# Patient Record
Sex: FEMALE | Race: WHITE | Employment: FULL TIME | URBAN - METROPOLITAN AREA
[De-identification: names, ages, dates, MRNs, and addresses within clinical notes are randomized per-mention and may not be internally consistent; named-entity substitution may affect disease eponyms.]

---

## 2017-05-19 ENCOUNTER — OP HISTORICAL/CONVERTED ENCOUNTER (OUTPATIENT)
Dept: OTHER | Age: 27
End: 2017-05-19

## 2017-08-05 ENCOUNTER — HOSPITAL ENCOUNTER (EMERGENCY)
Age: 27
Discharge: HOME | End: 2017-08-05
Attending: EMERGENCY MEDICINE
Payer: COMMERCIAL

## 2017-08-05 VITALS
DIASTOLIC BLOOD PRESSURE: 70 MMHG | WEIGHT: 145 LBS | TEMPERATURE: 97.5 F | BODY MASS INDEX: 24.16 KG/M2 | HEART RATE: 45 BPM | OXYGEN SATURATION: 100 % | HEIGHT: 65 IN | SYSTOLIC BLOOD PRESSURE: 124 MMHG | RESPIRATION RATE: 24 BRPM

## 2017-08-05 DIAGNOSIS — R11.2 NAUSEA AND VOMITING, INTRACTABILITY OF VOMITING NOT SPECIFIED, UNSPECIFIED VOMITING TYPE: Primary | ICD-10-CM

## 2017-08-05 LAB
ALBUMIN SERPL BCP-MCNC: 4.1 G/DL (ref 3.5–5)
ALBUMIN/GLOB SERPL: 1 {RATIO} (ref 1.1–2.2)
ALP SERPL-CCNC: 73 U/L (ref 45–117)
ALT SERPL-CCNC: 36 U/L (ref 12–78)
ANION GAP BLD CALC-SCNC: 9 MMOL/L (ref 5–15)
AST SERPL W P-5'-P-CCNC: 23 U/L (ref 15–37)
BASOPHILS # BLD AUTO: 0.1 K/UL (ref 0–0.1)
BASOPHILS # BLD: 1 % (ref 0–1)
BILIRUB SERPL-MCNC: 0.5 MG/DL (ref 0.2–1)
BUN SERPL-MCNC: 7 MG/DL (ref 6–20)
BUN/CREAT SERPL: 7 (ref 12–20)
CALCIUM SERPL-MCNC: 9 MG/DL (ref 8.5–10.1)
CHLORIDE SERPL-SCNC: 107 MMOL/L (ref 97–108)
CO2 SERPL-SCNC: 23 MMOL/L (ref 21–32)
CREAT SERPL-MCNC: 1.04 MG/DL (ref 0.55–1.02)
EOSINOPHIL # BLD: 0.4 K/UL (ref 0–0.4)
EOSINOPHIL NFR BLD: 4 % (ref 0–7)
ERYTHROCYTE [DISTWIDTH] IN BLOOD BY AUTOMATED COUNT: 14.1 % (ref 11.5–14.5)
GLOBULIN SER CALC-MCNC: 4.1 G/DL (ref 2–4)
GLUCOSE SERPL-MCNC: 113 MG/DL (ref 65–100)
HCT VFR BLD AUTO: 39.4 % (ref 35–47)
HGB BLD-MCNC: 13 G/DL (ref 11.5–16)
LYMPHOCYTES # BLD AUTO: 30 % (ref 12–49)
LYMPHOCYTES # BLD: 3.2 K/UL (ref 0.8–3.5)
MCH RBC QN AUTO: 30.4 PG (ref 26–34)
MCHC RBC AUTO-ENTMCNC: 33 G/DL (ref 30–36.5)
MCV RBC AUTO: 92.1 FL (ref 80–99)
MONOCYTES # BLD: 0.6 K/UL (ref 0–1)
MONOCYTES NFR BLD AUTO: 6 % (ref 5–13)
NEUTS SEG # BLD: 6.5 K/UL (ref 1.8–8)
NEUTS SEG NFR BLD AUTO: 59 % (ref 32–75)
PLATELET # BLD AUTO: 318 K/UL (ref 150–400)
POTASSIUM SERPL-SCNC: 3.7 MMOL/L (ref 3.5–5.1)
PROT SERPL-MCNC: 8.2 G/DL (ref 6.4–8.2)
RBC # BLD AUTO: 4.28 M/UL (ref 3.8–5.2)
SODIUM SERPL-SCNC: 139 MMOL/L (ref 136–145)
WBC # BLD AUTO: 10.9 K/UL (ref 3.6–11)

## 2017-08-05 PROCEDURE — 80053 COMPREHEN METABOLIC PANEL: CPT | Performed by: EMERGENCY MEDICINE

## 2017-08-05 PROCEDURE — 99284 EMERGENCY DEPT VISIT MOD MDM: CPT

## 2017-08-05 PROCEDURE — 74011250636 HC RX REV CODE- 250/636: Performed by: EMERGENCY MEDICINE

## 2017-08-05 PROCEDURE — 36415 COLL VENOUS BLD VENIPUNCTURE: CPT | Performed by: EMERGENCY MEDICINE

## 2017-08-05 PROCEDURE — 85025 COMPLETE CBC W/AUTO DIFF WBC: CPT | Performed by: EMERGENCY MEDICINE

## 2017-08-05 PROCEDURE — 96360 HYDRATION IV INFUSION INIT: CPT

## 2017-08-05 RX ORDER — PROMETHAZINE HYDROCHLORIDE 25 MG/1
25 TABLET ORAL
Qty: 12 TAB | Refills: 0 | Status: SHIPPED | OUTPATIENT
Start: 2017-08-05 | End: 2022-05-08

## 2017-08-05 RX ORDER — ONDANSETRON 2 MG/ML
4 INJECTION INTRAMUSCULAR; INTRAVENOUS
Status: DISCONTINUED | OUTPATIENT
Start: 2017-08-05 | End: 2017-08-05 | Stop reason: HOSPADM

## 2017-08-05 RX ADMIN — SODIUM CHLORIDE 1000 ML: 900 INJECTION, SOLUTION INTRAVENOUS at 09:22

## 2017-08-05 NOTE — ED PROVIDER NOTES
HPI Comments: 24 y.o. female with past medical history significant for migraines and depression who presents in a wheelchair from home accompanied by her mother with chief complaint of vomiting. Pt states she woke up at 1930 this morning feeling feverish, diaphoretic and nauseous. Pt states she went to the bathroom where she vomiting at least 10 times. Pt states throughout the episodes, her \"whole body was tingling from head to toe\". Pt states she was having difficulty controlling her breathing. Pt states she tried to call her mother, but her hands \"got stuck and (she) couldn't dial the numbers\". When she did get in contact with her mom, her mom noticed her speech sounded garbled, like her tongue was swollen. Pt states she was asymptomatic prior to going to bed. Pt states symptoms are improved when she is laying down still. Pt takes Concerta daily. Pt had a mild headache yesterday. Pt endorses a history of migraines. Pt specifically denies headache, abdominal pain, changes in bowel movements and changes in urination. There are no other acute medical concerns at this time. Social hx: denies tobacco use; denies EtOH use; denies illicit drug use  Significant FMHx: father - stroke in his 45s; sister - blood clots  PCP: Shonda Chappell MD    Note written by Femi Castaneda, as dictated by Vladimir Maynard MD 9:27 AM        The history is provided by the patient and a parent. Past Medical History:   Diagnosis Date    Depression     Migraine        Past Surgical History:   Procedure Laterality Date    HX BREAST AUGMENTATION           Family History:   Problem Relation Age of Onset    Stroke Father        Social History     Social History    Marital status: SINGLE     Spouse name: N/A    Number of children: N/A    Years of education: N/A     Occupational History    Not on file.      Social History Main Topics    Smoking status: Never Smoker    Smokeless tobacco: Never Used    Alcohol use No    Drug use: No    Sexual activity: Yes     Partners: Male     Birth control/ protection: None     Other Topics Concern    Not on file     Social History Narrative         ALLERGIES: Review of patient's allergies indicates no known allergies. Review of Systems   Constitutional: Positive for diaphoresis. Negative for chills and fever. HENT: Negative for rhinorrhea and sore throat. Respiratory: Positive for shortness of breath. Negative for cough. Cardiovascular: Negative for chest pain. Gastrointestinal: Positive for nausea and vomiting. Negative for abdominal pain, constipation and diarrhea. Genitourinary: Negative for dysuria and urgency. Musculoskeletal: Negative for arthralgias and back pain. Skin: Negative for rash. Neurological: Negative for dizziness, weakness and light-headedness. All other systems reviewed and are negative. Vitals:    08/05/17 0915   BP: 119/78   Pulse: (!) 45   Resp: 24   SpO2: 100%   Weight: 65.8 kg (145 lb)   Height: 5' 5\" (1.651 m)            Physical Exam   Nursing note and vitals reviewed. Const:  No acute distress, well developed, well nourished  Head:  Atraumatic, normocephalic  Eyes:  PERRL, conjunctiva normal, no scleral icterus  Neck:  Supple, trachea midline  Cardiovascular:  RRR, no murmurs, no gallops, no rubs  Resp:  No resp distress, no increased work of breathing, no wheezes, no rhonchi, no rales,  Abd:  Soft, non-tender, non-distended, no rebound, no guarding, no CVA tenderness  :  Deferred  MSK:  No pedal edema, normal ROM  Neuro:  Alert and oriented x3, no cranial nerve defect  Skin:  Warm, dry, intact.  Peeling skin on lower legs (pt states this is due to sunburn)  Psych: Anxious-appearing, behavior is normal, judgement and thought content is normal  Note written by Femi Dorman, as dictated by Alanna Andrade MD 9:27 AM     MDM  Number of Diagnoses or Management Options  Nausea and vomiting, intractability of vomiting not specified, unspecified vomiting type:      Amount and/or Complexity of Data Reviewed  Clinical lab tests: ordered and reviewed  Tests in the radiology section of CPT®: ordered and reviewed  Review and summarize past medical records: yes    Patient Progress  Patient progress: stable    ED Course     Pt. Presents to the ER with n/v and sx that sound like they are likely 2/2 hyperventilation. Pt. Is well appearing in the ER. Unclear cause of her problem. Pt. Refused several parts of her work-up and demanded to leave in the middle of her work-up. I will start her on phenergan. Pt. To f/u with her PCP or return to the ER with worsening sx. Procedures    10:59 AM  Pt requesting discharge per nurse. 11:15 AM  Discussed available lab and imaging results with patient and mother. Pt requesting discharge. Pt refusing to complete work-up in the ED. Both verbalize understanding and agree with care plan. Will discharge patient home with nausea medications and PCP f/u.

## 2017-08-05 NOTE — ED NOTES
Discharge instructions reviewed with patient who verbalized understanding and is ambulatory from the department.

## 2017-08-05 NOTE — DISCHARGE INSTRUCTIONS
Nausea and Vomiting: Care Instructions  Your Care Instructions    When you are nauseated, you may feel weak and sweaty and notice a lot of saliva in your mouth. Nausea often leads to vomiting. Most of the time you do not need to worry about nausea and vomiting, but they can be signs of other illnesses. Two common causes of nausea and vomiting are stomach flu and food poisoning. Nausea and vomiting from viral stomach flu will usually start to improve within 24 hours. Nausea and vomiting from food poisoning may last from 12 to 48 hours. The doctor has checked you carefully, but problems can develop later. If you notice any problems or new symptoms, get medical treatment right away. Follow-up care is a key part of your treatment and safety. Be sure to make and go to all appointments, and call your doctor if you are having problems. It's also a good idea to know your test results and keep a list of the medicines you take. How can you care for yourself at home? · To prevent dehydration, drink plenty of fluids, enough so that your urine is light yellow or clear like water. Choose water and other caffeine-free clear liquids until you feel better. If you have kidney, heart, or liver disease and have to limit fluids, talk with your doctor before you increase the amount of fluids you drink. · Rest in bed until you feel better. · When you are able to eat, try clear soups, mild foods, and liquids until all symptoms are gone for 12 to 48 hours. Other good choices include dry toast, crackers, cooked cereal, and gelatin dessert, such as Jell-O. When should you call for help? Call 911 anytime you think you may need emergency care. For example, call if:  · You passed out (lost consciousness). Call your doctor now or seek immediate medical care if:  · You have symptoms of dehydration, such as:  ¨ Dry eyes and a dry mouth. ¨ Passing only a little dark urine.   ¨ Feeling thirstier than usual.  · You have new or worsening belly pain. · You have a new or higher fever. · You vomit blood or what looks like coffee grounds. Watch closely for changes in your health, and be sure to contact your doctor if:  · You have ongoing nausea and vomiting. · Your vomiting is getting worse. · Your vomiting lasts longer than 2 days. · You are not getting better as expected. Where can you learn more? Go to http://shoshana-andriy.info/. Enter 25 [id number] in the search box to learn more about \"Nausea and Vomiting: Care Instructions. \"  Current as of: March 20, 2017  Content Version: 11.3  © 8812-7913 [employer]. Care instructions adapted under license by [employer] (which disclaims liability or warranty for this information). If you have questions about a medical condition or this instruction, always ask your healthcare professional. [address] any warranty or liability for your use of this information. We hope that we have addressed all of your medical concerns. The examination and treatment you received in the Emergency Department were for an emergent problem and were not intended as complete care. It is important that you follow up with your healthcare provider(s) for ongoing care. If your symptoms worsen or do not improve as expected, and you are unable to reach your usual health care provider(s), you should return to the Emergency Department. Today's healthcare is undergoing tremendous change, and patient satisfaction surveys are one of the many tools to assess the quality of medical care. You may receive a survey from the [employer] organization regarding your experience in the Emergency Department. I hope that your experience has been completely positive, particularly the medical care that I provided. As such, please participate in the survey; anything less than excellent does not meet my expectations or intentions.         [address] [facility] participate in nationally recognized quality of care measures. If your blood pressure is greater than 120/80, as reported below, we urge that you seek medical care to address the potential of high blood pressure, commonly known as hypertension. Hypertension can be hereditary or can be caused by certain medical conditions, pain, stress, or \"white coat syndrome. \"       Please make an appointment with your health care provider(s) for follow up of your Emergency Department visit. VITALS:   Patient Vitals for the past 8 hrs:   Temp Pulse Resp BP SpO2   08/05/17 1016 - - - 124/70 100 %   08/05/17 0915 97.5 °F (36.4 °C) (!) 45 24 119/78 100 %          Thank you for allowing us to provide you with medical care today. We realize that you have many choices for your emergency care needs. Please choose us in the future for any continued health care needs. [place] [facility] Gilbert, [address] [place] [address].   Office: [phone]            Recent Results (from the past 24 hour(s))   CBC WITH AUTOMATED DIFF    Collection Time: 08/05/17  9:24 AM   Result Value Ref Range    WBC 10.9 3.6 - 11.0 K/uL    RBC 4.28 3.80 - 5.20 M/uL    HGB 13.0 11.5 - 16.0 g/dL    HCT 39.4 35.0 - 47.0 %    MCV 92.1 80.0 - 99.0 FL    MCH 30.4 26.0 - 34.0 PG    MCHC 33.0 30.0 - 36.5 g/dL    RDW 14.1 11.5 - 14.5 %    PLATELET 543 540 - 461 K/uL    NEUTROPHILS 59 32 - 75 %    LYMPHOCYTES 30 12 - 49 %    MONOCYTES 6 5 - 13 %    EOSINOPHILS 4 0 - 7 %    BASOPHILS 1 0 - 1 %    ABS. NEUTROPHILS 6.5 1.8 - 8.0 K/UL    ABS. LYMPHOCYTES 3.2 0.8 - 3.5 K/UL    ABS. MONOCYTES 0.6 0.0 - 1.0 K/UL    ABS. EOSINOPHILS 0.4 0.0 - 0.4 K/UL    ABS.  BASOPHILS 0.1 0.0 - 0.1 K/UL   METABOLIC PANEL, COMPREHENSIVE    Collection Time: 08/05/17  9:24 AM   Result Value Ref Range    Sodium 139 136 - 145 mmol/L    Potassium 3.7 3.5 - 5.1 mmol/L    Chloride 107 97 - 108 mmol/L    CO2 23 21 - 32 mmol/L    Anion gap 9 5 - 15 mmol/L    Glucose 113 (H) 65 - 100 mg/dL    BUN 7 6 - 20 MG/DL    Creatinine 1.04 (H) 0.55 - 1.02 MG/DL    BUN/Creatinine ratio 7 (L) 12 - 20      GFR est AA >60 >60 ml/min/1.73m2    GFR est non-AA >60 >60 ml/min/1.73m2    Calcium 9.0 8.5 - 10.1 MG/DL    Bilirubin, total 0.5 0.2 - 1.0 MG/DL    ALT (SGPT) 36 12 - 78 U/L    AST (SGOT) 23 15 - 37 U/L    Alk.  phosphatase 73 45 - 117 U/L    Protein, total 8.2 6.4 - 8.2 g/dL    Albumin 4.1 3.5 - 5.0 g/dL    Globulin 4.1 (H) 2.0 - 4.0 g/dL    A-G Ratio 1.0 (L) 1.1 - 2.2

## 2017-08-05 NOTE — ED TRIAGE NOTES
Triage note: Pt states she woke up this morning soaked in sweat and began vomiting. Pt is hyperventilating.

## 2021-10-07 LAB
ANTIBODY SCREEN, EXTERNAL: NEGATIVE
HBSAG, EXTERNAL: NEGATIVE
HIV, EXTERNAL: NEGATIVE
RPR, EXTERNAL: NON REACTIVE
RUBELLA, EXTERNAL: NORMAL
TYPE, ABO & RH, EXTERNAL: NORMAL

## 2021-12-23 ENCOUNTER — HOSPITAL ENCOUNTER (OUTPATIENT)
Dept: PERINATAL CARE | Age: 31
Discharge: HOME | End: 2021-12-23
Attending: OBSTETRICS & GYNECOLOGY
Payer: COMMERCIAL

## 2021-12-23 PROCEDURE — 76811 OB US DETAILED SNGL FETUS: CPT | Performed by: OBSTETRICS & GYNECOLOGY

## 2022-03-16 ENCOUNTER — HOSPITAL ENCOUNTER (OUTPATIENT)
Age: 32
Discharge: HOME | End: 2022-03-16
Attending: OBSTETRICS & GYNECOLOGY | Admitting: OBSTETRICS & GYNECOLOGY
Payer: COMMERCIAL

## 2022-03-16 VITALS — DIASTOLIC BLOOD PRESSURE: 68 MMHG | HEART RATE: 87 BPM | SYSTOLIC BLOOD PRESSURE: 131 MMHG

## 2022-03-16 PROBLEM — O36.8130 DECREASED FETAL MOVEMENTS IN THIRD TRIMESTER: Status: ACTIVE | Noted: 2022-03-16

## 2022-03-16 PROBLEM — Z3A.32 32 WEEKS GESTATION OF PREGNANCY: Status: ACTIVE | Noted: 2022-03-16

## 2022-03-16 PROCEDURE — 99282 EMERGENCY DEPT VISIT SF MDM: CPT

## 2022-03-16 PROCEDURE — 59025 FETAL NON-STRESS TEST: CPT

## 2022-03-16 NOTE — PROGRESS NOTES
1900: Bedside and Verbal shift change report given to PANCHITO Cerda RN (oncoming nurse) by PANCHITO Washburn RN (offgoing nurse). Report included the following information SBAR, Kardex, Intake/Output, MAR and Recent Results. 2004: Dr. Mercado at bedside for ultrasound. Pt to be discharge at this time. Pt verbalizes understanding and agreeable to POC. NST reactive. Pt taken off EFM at this time. 2022: This RN at bedside. Pt educated on discharge instructions including kick counts and pregnancy precautions. follow up appts. 2028: Pt walking off unit at this time.

## 2022-03-17 NOTE — H&P
History & Physical    Name: Marisela Salas MRN: [id number]  SSN: [id number]    YOB: 1990  Age: 36 y.o. Sex: female        Subjective:   Chief Complaint: Decreased fetal movement  Estimated Date of Delivery: 22  OB History    Para Term  AB Living   1             SAB IAB Ectopic Molar Multiple Live Births                    # Outcome Date GA Lbr Jono/2nd Weight Sex Delivery Anes PTL Lv   1 Current                Griffin Fish, 36 y.o.,  ,  presents at [address] [place] [address], complaining of Decreased fetal movement. She reports little fetal movement all day. She was seen in the office and sent here for evaluation. Prenatal course was normal. Please see prenatal records for details. No Known Allergies    Prior to Admission medications    Medication Sig Start Date End Date Taking? Authorizing Provider   DEXTRAN 70/HYPROMELLOSE/PF (NATURAL TEARS, PF, OP) Apply 2 Drops to eye as needed. Provider, Historical   promethazine (PHENERGAN) 25 mg tablet Take 1 Tab by mouth every six (6) hours as needed for Nausea. 17   Roz Gutierrez MD   methylphenidate (CONCERTA) 36 mg CR tablet Take 36 mg by mouth every morning. Provider, Historical       Past Medical History:   Diagnosis Date    Depression     Migraine        Past Surgical History:   Procedure Laterality Date    HX BREAST AUGMENTATION         Social History     Occupational History    Not on file   Tobacco Use    Smoking status: Never Smoker    Smokeless tobacco: Never Used   Substance and Sexual Activity    Alcohol use: No    Drug use: No    Sexual activity: Yes     Partners: Male     Birth control/protection: None       Family History   Problem Relation Age of Onset    Stroke Father        Review of Systems   All other systems reviewed and are negative. Objective:     Physical Exam:    Visit Vitals  /68   Pulse 87       General:   36 y.o.  female who appears to be in no acute distress. HEENT:   Normocephalic. Pupils are equal, round, and reactive to light. Extraocular movements are intact. Pharynx is clear. Neck:   Normal range of motion. No thyromegaly. Heart:   Regular rate and rhythm. No murmurs present. Lungs:   Clear, no rales, rhonchi, or wheezes. Abdomen:   Soft, gravid, not tender, normal bowel sounds. No CVA tenderness   Leopold's: Vertex   Uterine Activity:    Frequency: None  Fetal Heart Rate:     Baseline: 135 bpm    Variability: Moderate    Accelerations: Present (15 x 15 bpm)    Decelerations: None  Category: 1  Pelvic:    Membranes: Intact  Extremites:   Trace bilateral pedal edema. Full range of motion. Neuro:    Alert. Oriented. CN 2 - 12 intact. Motor and sensory exam grossly normal.      Prenatal Labs   Lab Results   Component Value Date/Time    Rubella, External Immune 10/07/2021 12:00 AM    HBsAg, External Negative 10/07/2021 12:00 AM    HIV, External Negative 10/07/2021 12:00 AM    RPR, External Non Reactive 10/07/2021 12:00 AM    ABO,Rh O Positive 10/07/2021 12:00 AM     No results found for this or any previous visit (from the past 12 hour(s)).       Assessment/Plan:     Active Hospital Problems    Diagnosis Date Noted    Decreased fetal movements in third trimester 03/16/2022     Priority: 1 - One    32 weeks gestation of pregnancy 03/16/2022     Priority: 2 - Two     NST is reaactive  US show good growth, normal fluid and fetal movement  Discharge home  Follow up as scheduled  Call if fetal movement decreases  Katie Vee MD  3/16/2022

## 2022-03-17 NOTE — PROGRESS NOTES
Fetal Nonstress Test  Indication:decreased fetal movement  Baseline: 135 bpm  Variability:Moderate  Accelerations: Present (15 x 15 bpm) multiple  Decelerations: None    Interpretation: Reactive

## 2022-03-17 NOTE — PROGRESS NOTES
Indication:Decreased Fetal movement    Uterus[de-identified] Normal    Miller    Placenta: Anterior    Amniotic Fluid:Normal    Fetal Movement: Present    Measurements:   BPD: 8.31 cm  33Weeks 3 Days   HC:   29.96 cm  33Weeks 1 Days   AC:   30.44 cm  34Weeks 3 Days   FL:    6.51 cm  33Weeks 4 Days   EFW: 2313 [address] 62 Percentile    FHR:  135 BPM  Anatomy:  Head  Shape: Normal  Cavum septi pellucidi: Normal  Midline falx: Normal  Thalami: Normal  Lateral ventricle: Normal  Cerebellum: Not Visualized  Cisterna magna: Not Visualized  Face  Upper lip: Not Visualized  Orbits: Normal  Median profile: Not Visualized  Nose: Not Visualized  Nostrils: Not Visualized  Neck: Normal  Thorax  Shape: Normal  No masses: Normal  Heart: Normal  Heart activity: Normal  Size: Normal  Cardiac axis: Normal  Four-chamber view: Not Visualized  Left ventricular outflow: Not Visualized  Right ventricular outflow: Not Visualized  Abdomen  Stomach: Normal  Bowel: Normal  Kidneys: Normal  Urinary bladder: Normal  Abdominal cord insertion: Normal  Cord vessels (optional): 3  Spine: Normal  Limbs  Right arm (incl. hand): Normal  Right leg (incl. foot): Normal  Left arm (incl. hand): Normal  Left leg (incl. foot): Normal    CONCLUSION:  Normal but incomplete examination. Good growth and normal AFV.   Fany Ramos MD  3/16/2022

## 2022-03-17 NOTE — DISCHARGE INSTRUCTIONS
Patient Education   Patient Education        Pregnancy Precautions: Care Instructions  Your Care Instructions     There is no sure way to prevent labor before your due date ( labor) or to prevent most other pregnancy problems. But there are things you can do to increase your chances of a healthy pregnancy. Go to your appointments, follow your doctor's advice, and take good care of yourself. Eat well, and exercise (if your doctor agrees). And make sure to drink plenty of water. Follow-up care is a key part of your treatment and safety. Be sure to make and go to all appointments, and call your doctor if you are having problems. It's also a good idea to know your test results and keep a list of the medicines you take. How can you care for yourself at home? · Make sure you go to your prenatal appointments. At each visit, your doctor will check your blood pressure. Your doctor will also check to see if you have protein in your urine. High blood pressure and protein in urine are signs of preeclampsia. This condition can be dangerous for you and your baby. · Drink plenty of fluids. Dehydration can cause contractions. If you have kidney, heart, or liver disease and have to limit fluids, talk with your doctor before you increase the amount of fluids you drink. · Tell your doctor right away if you notice any symptoms of an infection, such as:  ? Burning when you urinate. ? A foul-smelling discharge from your vagina. ? Vaginal itching. ? Unexplained fever. ? Unusual pain or soreness in your uterus or lower belly. · Eat a balanced diet. Include plenty of foods that are high in calcium and iron. ? Foods high in calcium include milk, cheese, yogurt, almonds, and broccoli. ? Foods high in iron include red meat, shellfish, poultry, eggs, beans, raisins, whole-grain bread, and leafy green vegetables. · Do not smoke. If you need help quitting, talk to your doctor about stop-smoking programs and medicines.  These can increase your chances of quitting for good. · Do not drink alcohol or use marijuana or illegal drugs. · Follow your doctor's directions about activity. Your doctor will let you know how much, if any, exercise you can do. · Ask your doctor if you can have sex. If you are at risk for early labor, your doctor may ask you to not have sex. · Take care to prevent falls. During pregnancy, your joints are loose, and your balance is off. Sports such as bicycling, skiing, or in-line skating can increase your risk of falling. And don't ride horses or motorcycles, dive, water ski, scuba dive, or parachute jump while you are pregnant. · Avoid things that can make your body too hot and may be harmful to your baby, such as a hot tub or sauna. Or talk with your doctor before doing anything that raises your body temperature. Your doctor can tell you if it's safe. · Do not take any over-the-counter or herbal medicines or supplements without talking to your doctor or pharmacist first.  When should you call for help? Call 911  anytime you think you may need emergency care. For example, call if:    · You passed out (lost consciousness).     · You have a seizure.     · You have severe vaginal bleeding.     · You have severe pain in your belly or pelvis.     · You have had fluid gushing or leaking from your vagina and you know or think the umbilical cord is bulging into your vagina. If this happens, immediately get down on your knees so your rear end (buttocks) is higher than your head. This will decrease the pressure on the cord until help arrives. Call your doctor now or seek immediate medical care if:    · You have signs of preeclampsia, such as:  ? Sudden swelling of your face, hands, or feet. ? New vision problems (such as dimness, blurring, or seeing spots).   ? A severe headache.     · You have any vaginal bleeding.     · You have belly pain or cramping.     · You have a fever.     · You have had regular contractions (with or without pain) for an hour. This means that you have 8 or more within 1 hour or 4 or more in 20 minutes after you change your position and drink fluids.     · You have a sudden release of fluid from your vagina.     · You have low back pain or pelvic pressure that does not go away.     · You notice that your baby has stopped moving or is moving much less than normal.   Watch closely for changes in your health, and be sure to contact your doctor if you have any problems. Where can you learn more? Go to http://www.watkins.com/  Enter Y951 in the search box to learn more about \"Pregnancy Precautions: Care Instructions. \"  Current as of: June 16, 2021               Content Version: 13.2  © 2006-2022 [employer]. Care instructions adapted under license by [employer] (which disclaims liability or warranty for this information). If you have questions about a medical condition or this instruction, always ask your healthcare professional. [place] [address] any warranty or liability for your use of this information. Counting Your Baby's Kicks: Care Instructions  Overview     Counting your baby's kicks is one way your doctor can tell that your baby is healthy. Most women--especially in a first pregnancy--feel their baby move for the first time between 16 and 22 weeks. The movement may feel like flutters rather than kicks. Your baby may move more at certain times of the day. When you are active, you may notice less kicking than when you are resting. At your prenatal visits, your doctor will ask whether the baby is active. In your last trimester, your doctor may ask you to count the number of times you feel your baby move. Follow-up care is a key part of your treatment and safety. Be sure to make and go to all appointments, and call your doctor if you are having problems.  It's also a good idea to know your test results and keep a list of the medicines you take. How do you count fetal kicks? · A common method of checking your baby's movement is to note the length of time it takes to count ten movements (such as kicks, flutters, or rolls). · Pick your baby's most active time of day to count. This may be any time from morning to evening. · If you don't feel 10 movements in an hour, have something to eat or drink and count for another hour. If you don't feel at least 10 movements in the 2-hour period, call your doctor. When should you call for help? Call your doctor now or seek immediate medical care if:    · You noticed that your baby has stopped moving or is moving much less than normal.   Watch closely for changes in your health, and be sure to contact your doctor if you have any problems. Where can you learn more? Go to http://www.gray.com/  Enter [id number] in the search box to learn more about \"Counting Your Baby's Kicks: Care Instructions. \"  Current as of: June 16, 2021               Content Version: 13.2  © 1340-2972 [employer]. Care instructions adapted under license by [employer] (which disclaims liability or warranty for this information). If you have questions about a medical condition or this instruction, always ask your healthcare professional. [address] any warranty or liability for your use of this information.

## 2022-03-24 PROBLEM — O36.8130 DECREASED FETAL MOVEMENTS IN THIRD TRIMESTER: Status: ACTIVE | Noted: 2022-03-16

## 2022-03-24 PROBLEM — Z3A.32 32 WEEKS GESTATION OF PREGNANCY: Status: ACTIVE | Noted: 2022-03-16

## 2022-04-07 LAB — GRBS, EXTERNAL: NEGATIVE

## 2022-05-05 ENCOUNTER — HOSPITAL ENCOUNTER (INPATIENT)
Age: 32
LOS: 3 days | Discharge: HOME | End: 2022-05-08
Attending: OBSTETRICS & GYNECOLOGY | Admitting: STUDENT IN AN ORGANIZED HEALTH CARE EDUCATION/TRAINING PROGRAM
Payer: COMMERCIAL

## 2022-05-05 PROBLEM — Z34.93 PREGNANT AND NOT YET DELIVERED IN THIRD TRIMESTER: Status: ACTIVE | Noted: 2022-05-05

## 2022-05-05 LAB
ALBUMIN SERPL-MCNC: 2.6 G/DL (ref 3.5–5)
ALBUMIN/GLOB SERPL: 0.7 {RATIO} (ref 1.1–2.2)
ALP SERPL-CCNC: 176 U/L (ref 45–117)
ALT SERPL-CCNC: 17 U/L (ref 12–78)
ANION GAP SERPL CALC-SCNC: 10 MMOL/L (ref 5–15)
AST SERPL-CCNC: 17 U/L (ref 15–37)
BASOPHILS # BLD: 0.1 K/UL (ref 0–0.1)
BASOPHILS NFR BLD: 1 % (ref 0–1)
BILIRUB SERPL-MCNC: 0.4 MG/DL (ref 0.2–1)
BUN SERPL-MCNC: 7 MG/DL (ref 6–20)
BUN/CREAT SERPL: 13 (ref 12–20)
CALCIUM SERPL-MCNC: 8.5 MG/DL (ref 8.5–10.1)
CHLORIDE SERPL-SCNC: 108 MMOL/L (ref 97–108)
CO2 SERPL-SCNC: 21 MMOL/L (ref 21–32)
CREAT SERPL-MCNC: 0.55 MG/DL (ref 0.55–1.02)
DIFFERENTIAL METHOD BLD: ABNORMAL
EOSINOPHIL # BLD: 0.2 K/UL (ref 0–0.4)
EOSINOPHIL NFR BLD: 1 % (ref 0–7)
ERYTHROCYTE [DISTWIDTH] IN BLOOD BY AUTOMATED COUNT: 13.2 % (ref 11.5–14.5)
GLOBULIN SER CALC-MCNC: 3.5 G/DL (ref 2–4)
GLUCOSE SERPL-MCNC: 75 MG/DL (ref 65–100)
HCT VFR BLD AUTO: 34.1 % (ref 35–47)
HGB BLD-MCNC: 11.3 G/DL (ref 11.5–16)
IMM GRANULOCYTES # BLD AUTO: 0.1 K/UL (ref 0–0.04)
IMM GRANULOCYTES NFR BLD AUTO: 1 % (ref 0–0.5)
LYMPHOCYTES # BLD: 2.4 K/UL (ref 0.8–3.5)
LYMPHOCYTES NFR BLD: 21 % (ref 12–49)
MCH RBC QN AUTO: 31 PG (ref 26–34)
MCHC RBC AUTO-ENTMCNC: 33.1 G/DL (ref 30–36.5)
MCV RBC AUTO: 93.4 FL (ref 80–99)
MONOCYTES # BLD: 1 K/UL (ref 0–1)
MONOCYTES NFR BLD: 8 % (ref 5–13)
NEUTS SEG # BLD: 8 K/UL (ref 1.8–8)
NEUTS SEG NFR BLD: 68 % (ref 32–75)
NRBC # BLD: 0 K/UL (ref 0–0.01)
NRBC BLD-RTO: 0 PER 100 WBC
PLATELET # BLD AUTO: 252 K/UL (ref 150–400)
PMV BLD AUTO: 9.9 FL (ref 8.9–12.9)
POTASSIUM SERPL-SCNC: 3.8 MMOL/L (ref 3.5–5.1)
PROT SERPL-MCNC: 6.1 G/DL (ref 6.4–8.2)
RBC # BLD AUTO: 3.65 M/UL (ref 3.8–5.2)
SODIUM SERPL-SCNC: 139 MMOL/L (ref 136–145)
WBC # BLD AUTO: 11.6 K/UL (ref 3.6–11)

## 2022-05-05 PROCEDURE — 65270000029 HC RM PRIVATE

## 2022-05-05 PROCEDURE — 74011250637 HC RX REV CODE- 250/637: Performed by: OBSTETRICS & GYNECOLOGY

## 2022-05-05 PROCEDURE — 74011250637 HC RX REV CODE- 250/637: Performed by: STUDENT IN AN ORGANIZED HEALTH CARE EDUCATION/TRAINING PROGRAM

## 2022-05-05 PROCEDURE — 85025 COMPLETE CBC W/AUTO DIFF WBC: CPT

## 2022-05-05 PROCEDURE — 3E033VJ INTRODUCTION OF OTHER HORMONE INTO PERIPHERAL VEIN, PERCUTANEOUS APPROACH: ICD-10-PCS | Performed by: OBSTETRICS & GYNECOLOGY

## 2022-05-05 PROCEDURE — 74011250636 HC RX REV CODE- 250/636: Performed by: OBSTETRICS & GYNECOLOGY

## 2022-05-05 PROCEDURE — 80053 COMPREHEN METABOLIC PANEL: CPT

## 2022-05-05 PROCEDURE — 59200 INSERT CERVICAL DILATOR: CPT | Performed by: OBSTETRICS & GYNECOLOGY

## 2022-05-05 PROCEDURE — 36415 COLL VENOUS BLD VENIPUNCTURE: CPT

## 2022-05-05 PROCEDURE — 75410000002 HC LABOR FEE PER 1 HR: Performed by: OBSTETRICS & GYNECOLOGY

## 2022-05-05 RX ORDER — DIPHENHYDRAMINE HCL 25 MG
50 CAPSULE ORAL
Status: DISCONTINUED | OUTPATIENT
Start: 2022-05-05 | End: 2022-05-06

## 2022-05-05 RX ORDER — OXYTOCIN/RINGER'S LACTATE 30/500 ML
2 PLASTIC BAG, INJECTION (ML) INTRAVENOUS
Status: DISCONTINUED | OUTPATIENT
Start: 2022-05-06 | End: 2022-05-06

## 2022-05-05 RX ORDER — SODIUM CHLORIDE 0.9 % (FLUSH) 0.9 %
5-40 SYRINGE (ML) INJECTION AS NEEDED
Status: CANCELLED | OUTPATIENT
Start: 2022-05-05

## 2022-05-05 RX ORDER — ACETAMINOPHEN 325 MG/1
650 TABLET ORAL
Status: DISCONTINUED | OUTPATIENT
Start: 2022-05-05 | End: 2022-05-06

## 2022-05-05 RX ORDER — NALBUPHINE HYDROCHLORIDE 10 MG/ML
10 INJECTION, SOLUTION INTRAMUSCULAR; INTRAVENOUS; SUBCUTANEOUS
Status: DISCONTINUED | OUTPATIENT
Start: 2022-05-05 | End: 2022-05-06

## 2022-05-05 RX ORDER — NALOXONE HYDROCHLORIDE 0.4 MG/ML
0.4 INJECTION, SOLUTION INTRAMUSCULAR; INTRAVENOUS; SUBCUTANEOUS AS NEEDED
Status: DISCONTINUED | OUTPATIENT
Start: 2022-05-05 | End: 2022-05-06 | Stop reason: SDUPTHER

## 2022-05-05 RX ORDER — SODIUM CHLORIDE 0.9 % (FLUSH) 0.9 %
5-40 SYRINGE (ML) INJECTION EVERY 8 HOURS
Status: CANCELLED | OUTPATIENT
Start: 2022-05-05

## 2022-05-05 RX ORDER — PROCHLORPERAZINE EDISYLATE 5 MG/ML
5 INJECTION INTRAMUSCULAR; INTRAVENOUS
Status: DISCONTINUED | OUTPATIENT
Start: 2022-05-05 | End: 2022-05-06

## 2022-05-05 RX ORDER — BUTORPHANOL TARTRATE 2 MG/ML
1 INJECTION INTRAMUSCULAR; INTRAVENOUS
Status: DISCONTINUED | OUTPATIENT
Start: 2022-05-05 | End: 2022-05-06

## 2022-05-05 RX ADMIN — PROCHLORPERAZINE EDISYLATE 10 MG: 5 INJECTION INTRAMUSCULAR; INTRAVENOUS at 21:11

## 2022-05-05 RX ADMIN — NALBUPHINE HYDROCHLORIDE 10 MG: 10 INJECTION, SOLUTION INTRAMUSCULAR; INTRAVENOUS; SUBCUTANEOUS at 21:10

## 2022-05-05 RX ADMIN — DIPHENHYDRAMINE HCL 50 MG: 25 CAPSULE ORAL at 21:10

## 2022-05-05 RX ADMIN — ACETAMINOPHEN 650 MG: 325 TABLET ORAL at 19:54

## 2022-05-05 RX ADMIN — Medication 25 MCG: at 19:54

## 2022-05-05 NOTE — H&P
History & Physical    Name: Anne Werner MRN: [id number]  SSN: [id number]    YOB: 1990  Age: 29 y.o. Sex: female      Subjective:     Estimated Date of Delivery: 22  OB History        1    Para        Term                AB        Living           SAB        IAB        Ectopic        Molar        Multiple        Live Births                    Ms. Emory Valdez is admitted with pregnancy at 40w0d for induction of labor due to elective induction. Prenatal course was normal.  Please see prenatal records for details. IVF pregnancy    Past Medical History:   Diagnosis Date    Migraine      Past Surgical History:   Procedure Laterality Date    HX BREAST AUGMENTATION      HX OTHER SURGICAL      breast aug 2014/2018    HX OTHER SURGICAL      lasix     AL BREAST SURGERY PROCEDURE UNLISTED       Social History     Occupational History    Not on file   Tobacco Use    Smoking status: Never Smoker    Smokeless tobacco: Never Used   Substance and Sexual Activity    Alcohol use: No    Drug use: No    Sexual activity: Yes     Partners: Male     Birth control/protection: None     Family History   Problem Relation Age of Onset    Stroke Father        No Known Allergies  Prior to Admission medications    Medication Sig Start Date End Date Taking? Authorizing Provider   PNV Comb #2-Iron-Omega 3-FA [id number] mg cmpk Take  by mouth. Yes Provider, Historical   DEXTRAN 70/HYPROMELLOSE/PF (NATURAL TEARS, PF, OP) Apply 2 Drops to eye as needed. Patient not taking: Reported on 2022    Provider, Historical   promethazine (PHENERGAN) 25 mg tablet Take 1 Tab by mouth every six (6) hours as needed for Nausea. Patient not taking: Reported on 2022   Freda Parham MD   methylphenidate (CONCERTA) 36 mg CR tablet Take 36 mg by mouth every morning.     Patient not taking: Reported on 2022    Provider, Historical        Review of Systems: A comprehensive review of systems was negative except for that written in the History of Present Illness. Objective:     Vitals:  Vitals:    05/05/22 1717 05/05/22 1747 05/05/22 1749   BP:   139/86   Pulse:   70   Resp:   16   Temp:   97.9 °F (36.6 °C)   SpO2:  98% 99%   Weight: 95.3 kg (210 lb)     Height: 5' 6\" (1.676 m)          Physical Exam:  Patient without distress. Heart: Regular rate and rhythm  Lung: clear to auscultation throughout lung fields, no wheezes, no rales, no rhonchi and normal respiratory effort  Abdomen: soft, nontender  Fundus: soft and non tender  Perineum: blood absent, amniotic fluid absent  Cervical Exam: 1 cm dilated    30% effaced    -3 station    Presenting Part: cephalic  Lower Extremities:  - Edema 1+  Membranes:  Intact  Fetal Heart Rate: Reactive  Baseline: 140 per minute  Variability: moderate  Accelerations: yes  Decelerations: none  Uterine contractions: none          Prenatal Labs:  O pos  Lab Results   Component Value Date/Time    Rubella, External Immune 10/07/2021 12:00 AM    HBsAg, External Negative 10/07/2021 12:00 AM    HIV, External Negative 10/07/2021 12:00 AM    RPR, External Non Reactive 10/07/2021 12:00 AM        Impression/Plan:   33yo G1 at 40w0d here for elective IOL     Plan: Admit for induction of labor. Group B Strep negative.   Cook balloon placed 60/60, miso 25mcg po Q2H x 4 doses for cervical ripening   Pitocin at 0600   Consents reviewed and signed  Category 1 fetal tracing     Signed By:  Rui Vazquez DO     May 5, 2022

## 2022-05-05 NOTE — PROGRESS NOTES
1700 PT ambulates onto unit for induction. 18 Dr. Saad Baldwin at bedside. Plan of care being discussed with PT at this time. Plan is to start cytotec later this evening and start pitocin in the morning. PT agrees with plan of care and has no questions or concerns. Whpiple bulb(60/60)  placed at this time, PT tolerated well. 1905 SBAR report given to MYA Yung RN. Care handed off at this time.

## 2022-05-06 ENCOUNTER — ANESTHESIA EVENT (OUTPATIENT)
Dept: LABOR AND DELIVERY | Age: 32
End: 2022-05-06
Payer: COMMERCIAL

## 2022-05-06 ENCOUNTER — ANESTHESIA (OUTPATIENT)
Dept: LABOR AND DELIVERY | Age: 32
End: 2022-05-06
Payer: COMMERCIAL

## 2022-05-06 PROCEDURE — 75410000003 HC RECOV DEL/VAG/CSECN EA 0.5 HR: Performed by: OBSTETRICS & GYNECOLOGY

## 2022-05-06 PROCEDURE — 65270000029 HC RM PRIVATE

## 2022-05-06 PROCEDURE — 74011250636 HC RX REV CODE- 250/636: Performed by: OBSTETRICS & GYNECOLOGY

## 2022-05-06 PROCEDURE — 0KQM0ZZ REPAIR PERINEUM MUSCLE, OPEN APPROACH: ICD-10-PCS | Performed by: OBSTETRICS & GYNECOLOGY

## 2022-05-06 PROCEDURE — 74011250636 HC RX REV CODE- 250/636: Performed by: STUDENT IN AN ORGANIZED HEALTH CARE EDUCATION/TRAINING PROGRAM

## 2022-05-06 PROCEDURE — 74011000250 HC RX REV CODE- 250: Performed by: NURSE ANESTHETIST, CERTIFIED REGISTERED

## 2022-05-06 PROCEDURE — 75410000002 HC LABOR FEE PER 1 HR: Performed by: OBSTETRICS & GYNECOLOGY

## 2022-05-06 PROCEDURE — 10907ZC DRAINAGE OF AMNIOTIC FLUID, THERAPEUTIC FROM PRODUCTS OF CONCEPTION, VIA NATURAL OR ARTIFICIAL OPENING: ICD-10-PCS | Performed by: OBSTETRICS & GYNECOLOGY

## 2022-05-06 PROCEDURE — 75410000000 HC DELIVERY VAGINAL/SINGLE: Performed by: OBSTETRICS & GYNECOLOGY

## 2022-05-06 PROCEDURE — 74011250637 HC RX REV CODE- 250/637: Performed by: STUDENT IN AN ORGANIZED HEALTH CARE EDUCATION/TRAINING PROGRAM

## 2022-05-06 PROCEDURE — 65410000002 HC RM PRIVATE OB

## 2022-05-06 PROCEDURE — 74011250637 HC RX REV CODE- 250/637: Performed by: OBSTETRICS & GYNECOLOGY

## 2022-05-06 PROCEDURE — 00HU33Z INSERTION OF INFUSION DEVICE INTO SPINAL CANAL, PERCUTANEOUS APPROACH: ICD-10-PCS | Performed by: ANESTHESIOLOGY

## 2022-05-06 PROCEDURE — 76060000078 HC EPIDURAL ANESTHESIA: Performed by: NURSE ANESTHETIST, CERTIFIED REGISTERED

## 2022-05-06 PROCEDURE — 77030014125 HC TY EPDRL BBMI -B: Performed by: NURSE ANESTHETIST, CERTIFIED REGISTERED

## 2022-05-06 RX ORDER — NALOXONE HYDROCHLORIDE 0.4 MG/ML
0.4 INJECTION, SOLUTION INTRAMUSCULAR; INTRAVENOUS; SUBCUTANEOUS AS NEEDED
Status: DISCONTINUED | OUTPATIENT
Start: 2022-05-06 | End: 2022-05-08 | Stop reason: HOSPADM

## 2022-05-06 RX ORDER — OXYTOCIN/RINGER'S LACTATE 30/500 ML
10 PLASTIC BAG, INJECTION (ML) INTRAVENOUS AS NEEDED
Status: COMPLETED | OUTPATIENT
Start: 2022-05-06 | End: 2022-05-06

## 2022-05-06 RX ORDER — ZOLPIDEM TARTRATE 5 MG/1
5 TABLET ORAL
Status: DISCONTINUED | OUTPATIENT
Start: 2022-05-06 | End: 2022-05-08 | Stop reason: HOSPADM

## 2022-05-06 RX ORDER — SODIUM CHLORIDE, SODIUM LACTATE, POTASSIUM CHLORIDE, CALCIUM CHLORIDE 600; 310; 30; 20 MG/100ML; MG/100ML; MG/100ML; MG/100ML
125 INJECTION, SOLUTION INTRAVENOUS CONTINUOUS
Status: DISCONTINUED | OUTPATIENT
Start: 2022-05-06 | End: 2022-05-06

## 2022-05-06 RX ORDER — LIDOCAINE HYDROCHLORIDE 20 MG/ML
INJECTION, SOLUTION INFILTRATION; PERINEURAL AS NEEDED
Status: DISCONTINUED | OUTPATIENT
Start: 2022-05-06 | End: 2022-05-06 | Stop reason: HOSPADM

## 2022-05-06 RX ORDER — ONDANSETRON 2 MG/ML
4 INJECTION INTRAMUSCULAR; INTRAVENOUS
Status: DISCONTINUED | OUTPATIENT
Start: 2022-05-06 | End: 2022-05-08 | Stop reason: HOSPADM

## 2022-05-06 RX ORDER — LIDOCAINE HYDROCHLORIDE AND EPINEPHRINE 15; 5 MG/ML; UG/ML
INJECTION, SOLUTION EPIDURAL AS NEEDED
Status: DISCONTINUED | OUTPATIENT
Start: 2022-05-06 | End: 2022-05-06 | Stop reason: HOSPADM

## 2022-05-06 RX ORDER — ACETAMINOPHEN 325 MG/1
650 TABLET ORAL
Status: DISCONTINUED | OUTPATIENT
Start: 2022-05-06 | End: 2022-05-08 | Stop reason: HOSPADM

## 2022-05-06 RX ORDER — IBUPROFEN 800 MG/1
800 TABLET ORAL EVERY 8 HOURS
Status: DISCONTINUED | OUTPATIENT
Start: 2022-05-06 | End: 2022-05-08 | Stop reason: HOSPADM

## 2022-05-06 RX ORDER — HYDROCODONE BITARTRATE AND ACETAMINOPHEN 5; 325 MG/1; MG/1
2 TABLET ORAL
Status: DISCONTINUED | OUTPATIENT
Start: 2022-05-06 | End: 2022-05-08 | Stop reason: HOSPADM

## 2022-05-06 RX ORDER — SIMETHICONE 80 MG
80 TABLET,CHEWABLE ORAL
Status: DISCONTINUED | OUTPATIENT
Start: 2022-05-06 | End: 2022-05-08 | Stop reason: HOSPADM

## 2022-05-06 RX ORDER — HYDROCODONE BITARTRATE AND ACETAMINOPHEN 5; 325 MG/1; MG/1
1 TABLET ORAL
Status: DISCONTINUED | OUTPATIENT
Start: 2022-05-06 | End: 2022-05-08 | Stop reason: HOSPADM

## 2022-05-06 RX ORDER — OXYTOCIN/RINGER'S LACTATE 30/500 ML
87.3 PLASTIC BAG, INJECTION (ML) INTRAVENOUS AS NEEDED
Status: DISCONTINUED | OUTPATIENT
Start: 2022-05-06 | End: 2022-05-08 | Stop reason: HOSPADM

## 2022-05-06 RX ORDER — DOCUSATE SODIUM 100 MG/1
100 CAPSULE, LIQUID FILLED ORAL
Status: DISCONTINUED | OUTPATIENT
Start: 2022-05-06 | End: 2022-05-08 | Stop reason: HOSPADM

## 2022-05-06 RX ORDER — NALOXONE HYDROCHLORIDE 0.4 MG/ML
0.4 INJECTION, SOLUTION INTRAMUSCULAR; INTRAVENOUS; SUBCUTANEOUS AS NEEDED
Status: DISCONTINUED | OUTPATIENT
Start: 2022-05-06 | End: 2022-05-06

## 2022-05-06 RX ORDER — OXYTOCIN/RINGER'S LACTATE 30/500 ML
10 PLASTIC BAG, INJECTION (ML) INTRAVENOUS AS NEEDED
Status: DISCONTINUED | OUTPATIENT
Start: 2022-05-06 | End: 2022-05-08 | Stop reason: HOSPADM

## 2022-05-06 RX ORDER — EPHEDRINE SULFATE/0.9% NACL/PF 50 MG/5 ML
10 SYRINGE (ML) INTRAVENOUS
Status: DISCONTINUED | OUTPATIENT
Start: 2022-05-06 | End: 2022-05-06

## 2022-05-06 RX ORDER — FENTANYL/BUPIVACAINE/NS/PF 2-1250MCG
1-16 PREFILLED PUMP RESERVOIR EPIDURAL CONTINUOUS
Status: DISCONTINUED | OUTPATIENT
Start: 2022-05-06 | End: 2022-05-06

## 2022-05-06 RX ORDER — METHYLERGONOVINE MALEATE 0.2 MG/ML
0.2 INJECTION INTRAVENOUS
Status: ACTIVE | OUTPATIENT
Start: 2022-05-06 | End: 2022-05-07

## 2022-05-06 RX ORDER — HYDROCORTISONE ACETATE PRAMOXINE HCL 2.5; 1 G/100G; G/100G
CREAM TOPICAL AS NEEDED
Status: DISCONTINUED | OUTPATIENT
Start: 2022-05-06 | End: 2022-05-06 | Stop reason: RX

## 2022-05-06 RX ORDER — DIPHENHYDRAMINE HCL 25 MG
25 CAPSULE ORAL
Status: DISCONTINUED | OUTPATIENT
Start: 2022-05-06 | End: 2022-05-08 | Stop reason: HOSPADM

## 2022-05-06 RX ADMIN — IBUPROFEN 800 MG: 800 TABLET, FILM COATED ORAL at 23:00

## 2022-05-06 RX ADMIN — SODIUM CHLORIDE, POTASSIUM CHLORIDE, SODIUM LACTATE AND CALCIUM CHLORIDE 125 ML/HR: 600; 310; 30; 20 INJECTION, SOLUTION INTRAVENOUS at 13:45

## 2022-05-06 RX ADMIN — LIDOCAINE HYDROCHLORIDE AND EPINEPHRINE 3 ML: 15; 5 INJECTION, SOLUTION EPIDURAL at 10:01

## 2022-05-06 RX ADMIN — OXYTOCIN 30000 MILLI-UNITS: 10 INJECTION INTRAVENOUS at 20:47

## 2022-05-06 RX ADMIN — SODIUM CHLORIDE, POTASSIUM CHLORIDE, SODIUM LACTATE AND CALCIUM CHLORIDE 1000 ML: 600; 310; 30; 20 INJECTION, SOLUTION INTRAVENOUS at 06:25

## 2022-05-06 RX ADMIN — OXYTOCIN 2 MILLI-UNITS/MIN: 10 INJECTION, SOLUTION INTRAMUSCULAR; INTRAVENOUS at 06:25

## 2022-05-06 RX ADMIN — LIDOCAINE HYDROCHLORIDE 5 ML: 20 INJECTION, SOLUTION INFILTRATION; PERINEURAL at 10:06

## 2022-05-06 RX ADMIN — Medication 10 ML/HR: at 17:47

## 2022-05-06 RX ADMIN — Medication 10 ML/HR: at 10:38

## 2022-05-06 RX ADMIN — Medication 25 MCG: at 01:39

## 2022-05-06 NOTE — ANESTHESIA PREPROCEDURE EVALUATION
Relevant Problems   No relevant active problems       Anesthetic History   No history of anesthetic complications            Review of Systems / Medical History  Patient summary reviewed and pertinent labs reviewed    Pulmonary  Within defined limits                 Neuro/Psych   Within defined limits           Cardiovascular  Within defined limits                Exercise tolerance: >4 METS     GI/Hepatic/Renal  Within defined limits              Endo/Other        Obesity     Other Findings   Comments: Elective induction at 40 weeks 1 day           Physical Exam    Airway  Mallampati: II  TM Distance: 4 - 6 cm  Neck ROM: normal range of motion   Mouth opening: Normal     Cardiovascular    Rhythm: regular  Rate: normal         Dental  No notable dental hx       Pulmonary  Breath sounds clear to auscultation               Abdominal  GI exam deferred       Other Findings            Anesthetic Plan    ASA: 2  Anesthesia type: CSE            Anesthetic plan and risks discussed with: Patient

## 2022-05-06 NOTE — PROGRESS NOTES
Labor Progress Note  Patient seen, fetal heart rate and contraction pattern evaluated, patient examined. co  Patient Vitals for the past 2 hrs:   BP Pulse   05/06/22 0625 136/86 72       Physical Exam:  Cervical Exam:  6 cm dilated    80% effaced    -2 station    Post AROM mod amount clear fluid  Uterine Activity: Frequency: Every 4 minutes  Fetal Heart Rate: Reactive  Baseline: 140 per minute  Variability: moderate  Accelerations: yes  Decelerations: none  Pit at 4  S/p miso. Cook out approx 0600  EFW 8    Assessment/Plan:  IUP 40wks 1 day IOL at patient request IVF pregnancy  FWB reassuring  IOL s/p miso/cook, AROM / pit now. Cont expectant management, epidural prn. Plan/expectations again reviewed. ques answered.      Angélica Early MD

## 2022-05-06 NOTE — ADT AUTH CERT NOTES
Comments  Comment        Facility Name: [address]                                 Patient Demographics    Patient Name   [address] [facility] [address] [facility], [facility] 72 Sex   Female    1990 Address   [address]  [facility] [address] Phone   [phone] (Mobile) *[facility] Account    Name Acct ID Class Status Primary Coverage   Bill Gilbert [id number] INPATIENT Open [place]IMA [place]            Guarantor Account (for Hospital Account [de-identified])    Name Relation to Pt Service Area Active? Acct Type   [place], [address] [facility] Yes Personal/Family   Address Phone     [address]  [place] [address]               Coverage Information (for Hospital Account [de-identified])    F/O Payor/Plan Subscriber  Subscriber Sex Precert #   OPTIMA/[place] OPT[place] PPO 89 F    Subscriber Subscriber #   Bill Gilbert [id number]   Grp # Group Name   [id number]    Address Phone   [address], [address]    Policy Number Status Effective Date Benefits Phone   [id number] -  -   Auth/Cert               Admission Information    Arrival Date/Time: 2022 165 Admit Date/Time: 2022 165 IP Adm.  Date/Time: 2022 165   Admission Type: Urgent Point of Origin:  Admit Category:    Means of Arrival:  Primary Service: Obstetrics Secondary Service: N/A   Transfer Source:  Service Area: [facility] Unit: [facility] [place] [address] LABOR & DELIVERY   Admit Provider: Sebastian Restrepo DO Attending Provider: Jose Dueñas MD Referring Provider:      Admission Information    Attending Provider Admission Dx Admitted on   Sebastian Restrepo DO Pregnant and not yet delivered in third trimester 22   Service Isolation Code Status   OBSTETRICS -- Prior   Allergies Advance Care Planning    No Known Allergies Jump to the Activity       Admission Information    Unit/Bed: [address] Service: OBSTETRICS   Admitting provider: Sebastian Restrepo DO Phone: [phone]   Attending provider: Jazmyne Shankar DO Phone: [phone]   PCP: Barry Tello MD Phone: [phone]   Admission dx:  Patient class: I   Admission type: UR       Patient Demographics    Patient Name   Gilda Cortes [facility]   [id number] Legal Sex   Female    1990 Address   [address] [place] [address] Phone   [phone] (Mobile) *Preferred*     H&P Notes       H&P by Jazmyne Shankar DO at 22 documented on Admission (Current) from 2022 in [place] [address]    Author: Jazmyne Shankar DO Author Type: Physician Filed: 22   Note Status: Signed Cosign: Cosign Not Required Date of Service: 22   : Jazmyne Shankar DO (Physician)                  History & Physical     Name: Baylee Ponce MRN: [id number]  SSN: [id number]    YOB: 1990  Age: 29 y.o. Sex: female       Subjective:      Estimated Date of Delivery: 22           OB History         1    Para        Term                AB        Living            SAB        IAB        Ectopic        Molar        Multiple        Live Births                       Ms. Fish is admitted with pregnancy at 40w0d for induction of labor due to elective induction. Prenatal course was normal.  Please see prenatal records for details.   IVF pregnancy          Past Medical History:   Diagnosis Date    Migraine              Past Surgical History:   Procedure Laterality Date    HX BREAST AUGMENTATION        HX OTHER SURGICAL         breast aug 2014/2018    HX OTHER SURGICAL         lasix 2019    MA BREAST SURGERY PROCEDURE UNLISTED          Social History            Occupational History    Not on file   Tobacco Use    Smoking status: Never Smoker    Smokeless tobacco: Never Used   Substance and Sexual Activity    Alcohol use: No    Drug use: No    Sexual activity: Yes       Partners: Male       Birth control/protection: None            Family History   Problem Relation Age of Onset  Stroke Father           No Known Allergies          Prior to Admission medications    Medication Sig Start Date End Date Taking? Authorizing Provider   PNV Comb #2-Iron-Omega 3-FA [id number] mg cmpk Take  by mouth.     Yes Provider, Historical   DEXTRAN 70/HYPROMELLOSE/PF (NATURAL TEARS, PF, OP) Apply 2 Drops to eye as needed. Patient not taking: Reported on 5/5/2022       Provider, Historical   promethazine (PHENERGAN) 25 mg tablet Take 1 Tab by mouth every six (6) hours as needed for Nausea. Patient not taking: Reported on 5/5/2022 8/5/17     Rick Mccullough MD   methylphenidate (CONCERTA) 36 mg CR tablet Take 36 mg by mouth every morning. Patient not taking: Reported on 5/5/2022       Provider, Historical         Review of Systems: A comprehensive review of systems was negative except for that written in the History of Present Illness.     Objective:      Vitals:        Vitals:     05/05/22 1717 05/05/22 1747 05/05/22 1749   BP:     139/86   Pulse:     70   Resp:     16   Temp:     97.9 °F (36.6 °C)   SpO2:   98% 99%   Weight: 95.3 kg (210 lb)       Height: 5' 6\" (1.676 m)             Physical Exam:  Patient without distress.   Heart: Regular rate and rhythm  Lung: clear to auscultation throughout lung fields, no wheezes, no rales, no rhonchi and normal respiratory effort  Abdomen: soft, nontender  Fundus: soft and non tender  Perineum: blood absent, amniotic fluid absent  Cervical Exam: 1 cm dilated    30% effaced    -3 station    Presenting Part: cephalic  Lower Extremities:  - Edema 1+  Membranes:  Intact  Fetal Heart Rate: Reactive  Baseline: 140 per minute  Variability: moderate  Accelerations: yes  Decelerations: none  Uterine contractions: none           Prenatal Labs:  O pos        Lab Results   Component Value Date/Time     Rubella, External Immune 10/07/2021 12:00 AM     HBsAg, External Negative 10/07/2021 12:00 AM     HIV, External Negative 10/07/2021 12:00 AM     RPR, External Non Reactive 10/07/2021 12:00 AM         Impression/Plan:   31yo G1 at 40w0d here for elective IOL      Plan: Admit for induction of labor. Group B Strep negative. Cook balloon placed 60/60, miso 25mcg po Q2H x 4 doses for cervical ripening   Pitocin at 0600   Consents reviewed and signed  Category 1 fetal tracing      Signed By:  Dickson Ryan DO      May 5, 2022                        H&P by Ron Huffman MD at 22 documented on Admission (Discharged) from 3/16/2022 in [facility] [place] [facility] [address] LABOR & DELIVERY    Author: Ron Huffman MD Author Type: Physician Filed: 22   Note Status: Signed Cosign: Cosign Not Required Date of Service: 22   : Ron Huffman MD (Physician)               Expand AllCollapse All    History & Physical     Name: Jaime Gonzalez MRN: [id number]  SSN: [id number]    YOB: 1990  Age: 36 y.o. Sex: female          Subjective:   Chief Complaint: Decreased fetal movement  Estimated Date of Delivery: 22                    OB History    Para Term  AB Living   1             SAB IAB Ectopic Molar Multiple Live Births                      # Outcome Date GA Lbr Jono/2nd Weight Sex Delivery Anes PTL Lv   1 Current                           Jaime Gonzalez, 36 y.o.,  ,  presents at [address] [place] [address], complaining of Decreased fetal movement. She reports little fetal movement all day. She was seen in the office and sent here for evaluation. Prenatal course was normal. Please see prenatal records for details.     No Known Allergies             Prior to Admission medications    Medication Sig Start Date End Date Taking? Authorizing Provider   DEXTRAN 70/HYPROMELLOSE/PF (NATURAL TEARS, PF, OP) Apply 2 Drops to eye as needed.       Provider, Historical   promethazine (PHENERGAN) 25 mg tablet Take 1 Tab by mouth every six (6) hours as needed for Nausea.  17     Rick Mccullough MD   methylphenidate (CONCERTA) 36 mg CR tablet Take 36 mg by mouth every morning.         Provider, Historical              Past Medical History:   Diagnosis Date    Depression      Migraine                 Past Surgical History:   Procedure Laterality Date    HX BREAST AUGMENTATION             Social History            Occupational History    Not on file   Tobacco Use    Smoking status: Never Smoker    Smokeless tobacco: Never Used   Substance and Sexual Activity    Alcohol use: No    Drug use: No    Sexual activity: Yes       Partners: Male       Birth control/protection: None               Family History   Problem Relation Age of Onset    Stroke Father           Review of Systems   All other systems reviewed and are negative.           Objective:      Physical Exam:     Visit Vitals  /68   Pulse 87         General:             36 y.o.  female who appears to be in no acute distress.      HEENT:   Normocephalic. Pupils are equal, round, and reactive to light. Extraocular movements are intact. Pharynx is clear. Neck:   Normal range of motion. No thyromegaly. Heart:             Regular rate and rhythm. No murmurs present. Lungs:             Clear, no rales, rhonchi, or wheezes. Abdomen:             Soft, gravid, not tender, normal bowel sounds. No CVA tenderness             Leopold's: Vertex             Uterine Activity:                        Frequency: None  Fetal Heart Rate:                         Baseline: 135 bpm                        Variability: Moderate                        Accelerations: Present (15 x 15 bpm)                        Decelerations: None  Category: 1  Pelvic:              Membranes: Intact  Extremites:             Trace bilateral pedal edema. Full range of motion. Neuro:              Alert. Oriented. CN 2 - 12 intact.   Motor and sensory exam grossly normal.        Prenatal Labs         Lab Results   Component Value Date/Time     Rubella, External Immune 10/07/2021 12:00 AM     HBsAg, External Negative 10/07/2021 12:00 AM     HIV, External Negative 10/07/2021 12:00 AM     RPR, External Non Reactive 10/07/2021 12:00 AM     ABO,Rh O Positive 10/07/2021 12:00 AM      Recent Results   No results found for this or any previous visit (from the past 12 hour(s)).            Assessment/Plan:            Active Hospital Problems     Diagnosis Date Noted    Decreased fetal movements in third trimester 2022       Priority: 1 - One    32 weeks gestation of pregnancy 2022       Priority: 2 - Two      NST is reaactive  US show good growth, normal fluid and fetal movement  Discharge home  Follow up as scheduled  Call if fetal movement decreases  Jeronimo Zhou MD  3/16/2022                    Patient Demographics    Patient Name   Usman Montanez   [id number] Legal Sex   Female    1990 Address   [address] [address] Phone   [phone] (Mobile) *Preferred*   CSN:   [id number]     Admit Date: Admit Time Room Bed   May 5, 2022  4:51 PM [phone]       Attending Providers    Provider Pager From To   Wanda Bhandari MD  22   Magda Nielson DO  22      Emergency Contact(s)    Name Relation Home Work Mobile   Chelsea Berger Mother [phone]     silvino tsai Boyfriend   [phone]     Comment            To print report, click blue 'Print' hyperlink at right    Report Name Print   Delivery:Mom Chart Print      [facility] [address]  [phone]       Patient: Blaire Aldridge  MRN: [id number]  :1990          Ana Jiménez     MRN: [id number]       Link to Baby  Comment        [de-identified] name MRN Account  Age Sex Admission Type    [place], Pending [id number]    Pending Admission     OB History       1    Para        Term                AB        Living          SAB        IAB        Ectopic        Molar        Multiple        Live Births             # Outcome Date GA Labor/2nd Weight Sex Delivery Anes PTL Lv A1 A5   1 Current                   Prenatal History     Most Recent Value   Did You Receive Prenatal Care Yes   Name Of OB Provider Stone   Seen By MFM (Maternal Fetal Medicine)? Yes   Fetal Ultrasound Abnormalities/Concerns? No   Infant Feeding Breast Milk   Circumcision Planned No   Pediatrician After Birth/ Follow Up Baby Visits  [facility]     Dating Summary    Working ABHISHEK: 22 set by Arleth Hernandez RN on 22 based on Other Basis     Based On ABHISHEK GA Diff GA User Date   Other Basis 22 Working  Arleth Hernandez RN 22     Prenatal Results      Prenatal Labs    Test Value Date Time   ABO/Rh * O Positive  10/07/21    Antibody Scrn * Negative  10/07/21    Hgb      Hct      Platelets      Rubella * Immune  10/07/21    RPR * Non Reactive  10/07/21    T.  Pallidum Antibody      Urine      Hep B Surf Ag * Negative  10/07/21    Hep C      HIV * Negative  10/07/21    Gonorrhea      Chlamydia      TSH      GTT, 1 HR (Glucola)      GTT, Fasting      GTT, 1 HR      GTT, 2 HR      GTT, 3 HR        3rd Trimester    Test Value Date Time   Hgb      Hct      Platelets      Group B Strep * negative  22    Antibody Scrn      TSH      T. Pallidum Antibody      Hep B Surf Ag      Gonorrhea      Chlamydia         Screening/Diagnostics    Test Value Date Time   AFP Only      AFP Tetra      Hgb Electrophoresis      Amniocentesis      Cystic Fibrosis      Thalessemia      Aram-Sachs      Canavan      PAP Smear      Beta HCG      NT      NIPT      COVID-19        Lab    Test Value Date Time   GTT, Fasting      GTT, 1HR      GTT, 2HR      GTT, 3HR      RPR * Non Reactive  10/07/21    Beta HCG      CMV Ab      Toxoplasma Ab      Varicella Zoster Ab           Legend    *: Historical  View all results for this pregnancy       [place], Pending [[id number]]      [place] Delivery    Birth date/time:   Delivery type:     Labor Events    Rupture date/time: 2022 0757  Rupture type: AROM  Fluid color: Clear    Delivery Providers    Delivering clinician:   Provider Role    Obstetrician    Primary Nurse    Primary  Nurse    NICU Nurse    Neonatologist    Anesthesiologist    CRNA    Nurse Practitioner    Midwife    Nursery Nurse     Multiple Birth Onset Second Stage    No data filed    Apgars    Living status:   Apgars:  1 min. :  5 min.:  10 min. :  15 min.:  20 min.:    Skin color:         Heart rate:         Reflex irritability:         Muscle tone:         Respiratory effort: Total:           Measurements    Weight:   Length:     Lacerations    No data filed    Placenta    No data filed    Vaginal Counts            Delivery Summary History    The Delivery Summary has not been signed.

## 2022-05-06 NOTE — ANESTHESIA PROCEDURE NOTES
Epidural Block    Patient location during procedure: OB  Start time: 5/6/2022 9:50 AM  End time: 5/6/2022 10:14 AM  Reason for block: primary anesthetic  Staffing  Performed: CRNA   Anesthesiologist: Vic Suero DO  Resident/CRNA: Beatriz Shah CRNA  Preanesthetic Checklist  Completed: patient identified, IV checked, risks and benefits discussed, monitors and equipment checked, pre-op evaluation and timeout performed  Block Placement  Patient position: sitting  Prep: Betadine  Sedation level: no sedation  Patient monitoring: continuous pulse oximetry, ETCO2, frequent blood pressure checks and heart rate  Approach: midline  Location: lumbar  Epidural  Loss of resistance technique: air  Guidance: landmark technique  Needle  Needle type: Tuohy   Needle gauge: 17 G  Needle length: 9 cm  Needle insertion depth: 8 cm  Catheter type: multi-orifice  Catheter size: 19 G  Catheter at skin depth: 12 cm  Catheter securement method: clear occlusive dressing  Test dose: negative  Assessment  Number of attempts: 1  Procedure assessment: patient tolerated procedure well with no immediate complications

## 2022-05-06 NOTE — PROGRESS NOTES
Labor Progress Note  Patient seen, fetal heart rate and contraction pattern evaluated, patient examined. More pressure  Patient Vitals for the past 2 hrs:   BP Temp Pulse Resp SpO2   05/06/22 1547 -- -- -- -- 98 %   05/06/22 1542 -- -- -- -- 99 %   05/06/22 1537 134/85 97.8 °F (36.6 °C) 67 16 99 %       Physical Exam:  Cervical Exam:  Ant lip per RN just now  Uterine Activity: Frequency: Every 2-4 minutes  Fetal Heart Rate: Reactive  Baseline: 140 per minute  Variability: moderate  Accelerations: yes  Decelerations: none  Pit 10  MVU approx 190    Assessment/Plan:  IUP 40wks 1 day IOL   FWB reassuring  Cont pit IOL, expectant management. Plan/expectations reviewed.     Reuben Conde MD

## 2022-05-06 NOTE — PROGRESS NOTES
0700 SBAR report received from MYA Puente RN. Care handed over at this time. 5364 Dr. Hoda Virgen at bedside. SVE per MD, karissa bulb 6/80/-1. AROM performed at this time. Moderate amount of clear fluid noted. PT tolerated well.    0900 PT requesting that RN begin bolus for epidural. Bolus started. 8497 PT turned on left side for minimal variablity. 0915 PT given popsicle for minimal variability. 3729 Dr. Tawana Elliott with anesthesia at bedside. [facility] [facility]    1245 Dr. Hoda Virgen at bedside. SVE per MD, 6-7/80/-2. IUPC placed at this time. MD would like MVU's of at least 200.     1330 SBAR report given to Prasad Brower RN.

## 2022-05-06 NOTE — PROGRESS NOTES
1315 Bedside and Verbal shift change report given to [address] (oncoming nurse) by Fide Mike RN (offgoing nurse). Report included the following information SBAR, Kardex, Procedure Summary, Intake/Output, MAR, Recent Results and Med Rec Status. 1345 Patient turned to lateral right with peanut ball between legs. 1515 Patient turned to lateral left with peanut ball between legs. 1615 Patient turned to lateral lright with peanut ball between legs    1650 Dr. Miah Adorno at bedside, dicussed care plan. Patient verbalized understanding. 1858 Patient is complete. Dr. Harish Holt to start pushing. 1800 Patient actively pushing. RN remains in continuous attendance at the bedside. Assessment & evaluation of fetal heart rate ongoing via continuous EFM. [address] Dr. Uma Sharma at bedside. 5912 Dr. Alfie Webb at bedside, plan of care discussed with pt. Patient verbalized understanding. 1910 Bedside and Verbal shift change report given to Carolyn Ochoa (oncoming nurse) by Naresh Silva RN (offgoing nurse). Report included the following information SBAR, Kardex, Procedure Summary, Intake/Output, MAR, Recent Results and Med Rec Status.

## 2022-05-06 NOTE — PROGRESS NOTES
Labor Progress Note  Patient seen, fetal heart rate and contraction pattern evaluated, patient examined. Comfortable w/ epidural  No data found. Physical Exam:  Cervical Exam:  6 cm dilated    80% effaced    -2 station    Anterior. IUPC placed  Uterine Activity: Frequency: Every 2-3 minutes not always tracing well  Fetal Heart Rate: Reactive  Baseline: 140  per minute  Variability: moderate  Accelerations: yes  Decelerations: occ late  Pit 6    Assessment/Plan:  IUP 40wks1 day IOL   FWB reassuring, cat 2 tracing  Cont pit IOL, IUPC placed for monitoring and titration of pitocin  Plan/expectations/ timing reviewed. All ques answered. Pt and family understand and agree.     Luis Araya MD

## 2022-05-06 NOTE — PROGRESS NOTES
1900- SBAR from WILIAM Kendrick RN    0630- FB removed with light downward traction     0700- SBAR WILIAM Olson RN

## 2022-05-07 PROCEDURE — 65270000029 HC RM PRIVATE

## 2022-05-07 PROCEDURE — 74011250637 HC RX REV CODE- 250/637: Performed by: OBSTETRICS & GYNECOLOGY

## 2022-05-07 PROCEDURE — 74011250637 HC RX REV CODE- 250/637: Performed by: STUDENT IN AN ORGANIZED HEALTH CARE EDUCATION/TRAINING PROGRAM

## 2022-05-07 RX ADMIN — IBUPROFEN 800 MG: 800 TABLET, FILM COATED ORAL at 06:40

## 2022-05-07 RX ADMIN — MUPIROCIN: 20 OINTMENT TOPICAL at 23:06

## 2022-05-07 RX ADMIN — IBUPROFEN 800 MG: 800 TABLET, FILM COATED ORAL at 22:37

## 2022-05-07 RX ADMIN — IBUPROFEN 800 MG: 800 TABLET, FILM COATED ORAL at 14:52

## 2022-05-07 NOTE — LACTATION NOTE
This note was copied from a baby's chart. Assisted mother with BF baby in side lying position. Baby latched well with rhythmic sucks. BF basics reviewed. Discussed with mother her plan for feeding. Reviewed the benefits of exclusive breast milk feeding during the hospital stay. Informed her of the risks of using formula to supplement in the first few days of life as well as the benefits of successful breast milk feeding; referred her to the Breastfeeding booklet about this information. She acknowledges understanding of information reviewed and states that it is her plan to breastfeed her infant. Will support her choice and offer additional information as needed. Reviewed breastfeeding basics:  How milk is made and normal  breastfeeding behaviors discussed. Supply and demand,  stomach size, early feeding cues, skin to skin bonding with comfortable positioning and baby led latch-on reviewed. How to identify signs of successful breastfeeding sessions reviewed; education on asymetrical latch, signs of effective latching vs shallow, in-effective latching, normal  feeding frequency and duration and expected infant output discussed. Hand Expression Education:  Mom taught how to manually hand express her colostrum. Emphasized the importance of providing infant with valuable colostrum as infant rests skin to skin at breast.  Aware to avoid extended periods of non-feeding. Aware to offer 10-20+ drops of colostrum every 2-3 hours until infant is latching and nursing effectively. Taught the rationale behind this low tech but highly effective evidence based practice. Pt will successfully establish breastfeeding by feeding in response to early feeding cues or wake every 3h, will obtain deep latch, and will keep log of feedings/output. Taught to BF at hunger cues and or q 2-3 hrs and to offer 10-20 drops of hand expressed colostrum at any non-feeds.       Breast Assessment  Left Breast: Medium,Large  Left Nipple: Everted,Intact  Right Breast: Medium,Large  Right Nipple: Everted,Intact  Breast- Feeding Assessment  Attends Breast-Feeding Classes: No  Breast-Feeding Experience: No  Breast Trauma/Surgery: No  Type/Quality: Good  Lactation Consultant Visits  Breast-Feedings: Good   Mother/Infant Observation  Mother Observation: Breast comfortable,Close hold  Infant Observation: Rhythmic suck,Relaxed after feeding,Opens mouth,Lips flanged, upper,Lips flanged, lower,Latches nipple and aereolae,Feeding cues  LATCH Documentation  Latch: Grasps breast, tongue down, lips flanged, rhythmic sucking  Audible Swallowing: A few with stimulation  Type of Nipple: Everted (after stimulation)  Comfort (Breast/Nipple): Soft/non-tender  Hold (Positioning): Full assist, teach one side, mother does other, staff holds  [facility] Score: 8

## 2022-05-07 NOTE — PROGRESS NOTES
Post-Partum Day Number 1 Progress Note    Patient doing well post-partum without significant complaint. Vitals:  Patient Vitals for the past 8 hrs:   BP Temp Pulse Resp SpO2   22 0100 (!) 126/58 98.8 °F (37.1 °C) 83 16 99 %     Temp (24hrs), Av.4 °F (36.9 °C), Min:97.8 °F (36.6 °C), Max:99.1 °F (37.3 °C)      Vital signs stable, afebrile. Exam:  Patient without distress. Abdomen soft, fundus firm at level of umbilicus, nontender                            Lower extremities are negative for swelling, cords or tenderness. Lab/Data Review: All lab results for the last 24 hours reviewed. Assessment and Plan:  Patient appears to be having uncomplicated post-partum course. Continue routine perineal care and maternal education. Plan discharge tomorrow if no problems occur.

## 2022-05-07 NOTE — L&D DELIVERY NOTE
Delivery Procedure Note    Delivery Physician:  Julia Wyatt MD    Procedure: Spontaneous vaginal delivery    Anesthesia: Epidural    Monitor:  Fetal Heart Tones - External and Uterine Contractions - Internal    Estimated Blood Loss: 400    Episiotomy: none    Laceration(s):  2nd degree    Laceration(s) repair: YES    Suture used for repair: 2-0 Monocryl    Fetal Description: heredia    Fetal Position: Left Occiput Anterior     Interventions: Nasopharyngeal/Oropharyngeal Suctioning, Warming/Drying and Monitoring vital signs    Birth:   [id number]   Delivery of a   female infant on 2022 at 7:46 PM. Apgars were 8  and 9 . Umbilical Cord: 3 Vessels     Umbilical Cord Events: Other(Comment)     Placenta: Expressed removal with Normal appearance. Amniotic Fluid Volume:   Moderate     Amniotic Fluid Description:  Clear        Birth weight: Pending    Apgar - One Minute: 8    Apgar - Five Minutes: 9    Umbilical Cord: Nuchal Cord x  1 and 3 vessels present    Placenta Removal: expressed    Placenta Appearance: normal intact    Specimens: None           Complications:  none      Signed By:  Julia Wyatt MD     May 6, 2022

## 2022-05-07 NOTE — PROGRESS NOTES
Pt up to BR walks with a steady gait. Able to void 1500ml. Radha care taught with verbal understanding and demonstration by pt. New ice pack, tucks, dermaplast panties and gown applied to pt. Pt assisted back to bed. Informed to call with needs and she needs to be assisted to BR at least once more time. Pt states understanding.

## 2022-05-08 VITALS
HEART RATE: 61 BPM | WEIGHT: 210 LBS | DIASTOLIC BLOOD PRESSURE: 69 MMHG | HEIGHT: 66 IN | BODY MASS INDEX: 33.75 KG/M2 | TEMPERATURE: 97.6 F | RESPIRATION RATE: 17 BRPM | SYSTOLIC BLOOD PRESSURE: 116 MMHG | OXYGEN SATURATION: 99 %

## 2022-05-08 PROCEDURE — 74011250637 HC RX REV CODE- 250/637: Performed by: OBSTETRICS & GYNECOLOGY

## 2022-05-08 RX ADMIN — ACETAMINOPHEN 650 MG: 325 TABLET ORAL at 10:52

## 2022-05-08 RX ADMIN — IBUPROFEN 800 MG: 800 TABLET, FILM COATED ORAL at 06:10

## 2022-05-08 NOTE — DISCHARGE SUMMARY
Obstetrical Discharge Summary     Name: César Curry MRN: [id number]  SSN: [id number]    YOB: 1990  Age: 29 y.o. Sex: female      Allergies: Patient has no known allergies. Admit Date: 2022    Discharge Date: 2022     Admitting Physician: Adryan Syed DO     Attending Physician:  Mliss Libman, DO     * Admission Diagnoses: Pregnant and not yet delivered in third trimester [Z34.93]    * Discharge Diagnoses:   Information for the patient's :  Polina, Female [facility] [[id number]]   Delivery of a 3.32 kg female infant via Vaginal, Spontaneous on 2022 at 7:46 PM  by Joyce Pollock. Apgars were 8  and 9 . Additional Diagnoses:   Hospital Problems as of 2022 Date Reviewed: 3/16/2022          Codes Class Noted - Resolved POA    Pregnant and not yet delivered in third trimester ICD-10-CM: Z34.93  ICD-9-CM: V22.1  2022 - Present Unknown             Lab Results   Component Value Date/Time    Rubella, External Immune 10/07/2021 12:00 AM    GrBStrep, External negative 2022 12:00 AM    ABO,Rh O Positive 10/07/2021 12:00 AM    There is no immunization history for the selected administration types on file for this patient. * Procedures: term  by Dr Ramsey Cardona  * No surgery found *           * Discharge Condition: [facility] Course: Normal hospital course following the delivery. * Disposition: Home    Discharge Medications:   Current Discharge Medication List      CONTINUE these medications which have NOT CHANGED    Details   PNV Comb #2-Iron-Omega 3-FA [id number] mg cmpk Take  by mouth. DEXTRAN 70/HYPROMELLOSE/PF (NATURAL TEARS, PF, OP) Apply 2 Drops to eye as needed. STOP taking these medications       promethazine (PHENERGAN) 25 mg tablet Comments:   Reason for Stopping:         methylphenidate (CONCERTA) 36 mg CR tablet Comments:   Reason for Stopping:               * Follow-up Care/Patient Instructions:   Activity: No sex for 6 weeks and No heavy lifting for 6 weeks  Diet: Regular Diet  Wound Care: As directed    RTO in 4-6 weeks or prn  Follow-up Information     Follow up With Specialties Details Why Contact Info    Sherin Benoit MD [facility] [address] [phone]

## 2022-05-08 NOTE — PROGRESS NOTES
Patient discharged to home. Education completed. Patient reported she had no more questions. Bands verified on patient and infant, see footprint sheet. Infant placed in carseat by parent.   Prescriptions: none

## 2022-05-08 NOTE — DISCHARGE INSTRUCTIONS
Vaginal Childbirth with Episiotomy, Laceration or Tear: What To Expect At [address] [place] [address] body will slowly heal in the next few weeks. It is easy to get too tired and overwhelmed during the first weeks after your baby is born. Changes in your hormones can shift your mood without warning. You may find it hard to meet the extra demands on your energy and time. Take it easy on yourself. Follow-up care is a key part of your treatment and safety. Be sure to make and go to all appointments, and call your doctor if you are having problems. It's also a good idea to know your test results and keep a list of the medicines you take. How can you care for yourself at home? Vaginal Bleeding and Cramps  · After delivery, you will have a bloody discharge from the vagina. This will turn pink within a week and then white or yellow after about 10 days. It may last for 2 to 4 weeks or longer, until the uterus has healed. Use pads instead of tampons until you stop bleeding. · Do not worry if you pass some blood clots, as long as they are smaller than a golf ball. If you have a tear or stitches in your vaginal area, change the pad at least every 4 hours to prevent soreness and infection. · You may have cramps for the first few days after childbirth. These are normal and occur as the uterus shrinks to normal size. Take an over-the-counter pain medicine, such as acetaminophen (Tylenol), ibuprofen (Advil, Motrin), or naproxen (Aleve), for cramps. Read and follow all instructions on the label. Do not take aspirin, because it can cause more bleeding. Do not take acetaminophen (Tylenol) and other acetaminophen containing medications (i.e. Percocet) at the same time. Episiotomy, Lacerations or Tears  · If you have stitches, they will dissolve on their own and do not need to be removed. · Put ice or a cold pack on your painful area for 10 to 20 minutes at a time, several times a day, for the first few days.  Put a thin cloth between the ice and your skin. · Sit in a few inches of warm water (sitz bath) 3 times a day and after bowel movements. The warm water helps with pain and itching. If you do not have a tub, a warm shower might help. Breast fullness  · Your breasts may overfill (engorge) in the first few days after delivery. To help milk flow and to relieve pain, warm your breasts in the shower or by using warm, moist towels before nursing. · If you are not nursing, do not put warmth on your breasts or touch your breasts. Wear a tight bra or sports bra and use ice until the fullness goes away. This usually takes 2 to 3 days. · Put ice or a cold pack on your breast after nursing to reduce swelling and pain. Put a thin cloth between the ice and your skin. Activity  · Eat a balanced diet. Do not try to lose weight by cutting calories. Keep taking your prenatal vitamins, or take a multivitamin. · Get as much rest as you can. Try to take naps when your baby sleeps during the day. · Get some exercise every day. But do not do any heavy exercise until your doctor says it is okay. · Wait until you are healed (about 4 to 6 weeks) before you have sexual intercourse. Your doctor will tell you when it is okay to have sex. · Talk to your doctor about birth control. You can get pregnant even before your period returns. Also, you can get pregnant while you are breast-feeding. Mental Health  · Many women get the \"baby blues\" during the first few days after childbirth. You may lose sleep, feel irritable, and cry easily. You may feel happy one minute and sad the next. Hormone changes are one cause of these emotional changes. Also, the demands of a new baby, along with visits from relatives or other family needs, add to a mother's stress. The \"baby blues\" often peak around the fourth day. Then they ease up in less than 2 weeks.   · If your moodiness or anxiety lasts for more than 2 weeks, or if you feel like life is not worth living, you may have postpartum depression. This is different for each mother. Some mothers with serious depression may worry intensely about their infant's well-being. Others may feel distant from their child. Some mothers might even feel that they might harm their baby. A mother may have signs of paranoia, wondering if someone is watching her. · With all the changes in your life, you may not know if you are depressed. Pregnancy sometimes causes changes in how you feel that are similar to the symptoms of depression. · Symptoms of depression include:  · Feeling sad or hopeless and losing interest in daily activities. These are the most common symptoms of depression. · Sleeping too much or not enough. · Feeling tired. You may feel as if you have no energy. · Eating too much or too little. · [employer] ([employer]) offers a Warm line; Chat with the Expert phone sessions; Information and Articles about Pregnancy and Postpartum Mood Disorders; Comprehensive List of Free Support Groups; Knowledgeable local coordinators who will offer support, information, and resources; Guide to Resources on Robin; Calendar of events in the  mood disorders community; Latest News and Research; and [facility] [address] for [employer]. Remember - You are not alone; You are not to blame; With help, you will be well. 1-130-551-PPD(9895). WWW. POSTPARTUM. NET    · Writing or talking about death, such as writing suicide notes or talking about guns, knives, or pills. Keep the numbers for these national suicide hotlines: 5-059-946-TALK (7-478.139.1864) and 5-695-YUPXACC (1-286.836.8711). If you or someone you know talks about suicide or feeling hopeless, get help right away. Constipation and Hemorrhoids  Drink plenty of fluids, enough so that your urine is light yellow or clear like water.  If you have kidney, heart, or liver disease and have to limit fluids, talk with your doctor before you increase the amount of fluids you drink. · Eat plenty of fiber each day. Have a bran muffin or bran cereal for breakfast, and try eating a piece of fruit for a mid-afternoon snack. · For painful, itchy hemorrhoids, put ice or a cold pack on the area several times a day for 10 minutes at a time. Follow this by putting a warm compress on the area for another 10 to 20 minutes or by sitting in a shallow, warm bath. When should you call for help? Call 911 anytime you think you may need emergency care. For example, call if:  · You are thinking of hurting yourself, your baby, or anyone else. · You passed out (lost consciousness). · You have symptoms of a blood clot in your lung (called a pulmonary embolism). These may include:  · Sudden chest pain. · Trouble breathing. · Coughing up blood. Call your doctor now or seek immediate medical care if:  · You have severe vaginal bleeding. · You are soaking through a pad each hour for 2 or more hours. · Your vaginal bleeding seems to be getting heavier or is still bright red 4 days after delivery. · You are dizzy or lightheaded, or you feel like you may faint. · You are vomiting or cannot keep fluids down. · You have a fever. · You have new or more belly pain. · You pass tissue (not just blood). · Your vaginal discharge smells bad. · Your belly feels tender or full and hard. · Your breasts are continuously painful or red. · You feel sad, anxious, or hopeless for more than a few days. · You have sudden, severe pain in your belly. · You have symptoms of a blood clot in your leg (called a deep vein thrombosis), such as:  · Pain in your calf, back of the knee, thigh, or groin. · Redness and swelling in your leg or groin. · You have symptoms of preeclampsia, such as:  · Sudden swelling of your face, hands, or feet. · New vision problems (such as dimness or blurring). · A severe headache. · Your blood pressure is higher than it should be or rises suddenly.   · You have new nausea or vomiting. Watch closely for changes in your health, and be sure to contact your doctor if you have any problems.

## 2022-05-08 NOTE — PROGRESS NOTES
Post-Partum Day Number 2 Progress/Discharge Note    Patient doing well post-partum without significant complaint. Vitals:  Patient Vitals for the past 8 hrs:   BP Temp Pulse Resp SpO2   22 0745 116/69 97.6 °F (36.4 °C) 61 17 99 %     Temp (24hrs), Av.2 °F (36.8 °C), Min:97.6 °F (36.4 °C), Max:99.1 °F (37.3 °C)      Vital signs stable, afebrile. Exam:  Patient without distress. Abdomen soft, fundus firm at level of umbilicus, non tender                              Lower extremities are negative for cords or tenderness. 1+ pretibial edema    Lab/Data Review: All lab results for the last 24 hours reviewed. Assessment and Plan:  Patient appears to be having uncomplicated post-partum course. Continue routine perineal care and maternal education. Plan discharge for today with follow up in our office in 6 weeks.

## 2022-05-13 NOTE — ADT AUTH CERT NOTES
Patient Demographics    Patient Name   Khari Arroyo [facility]   [id number] Legal Sex   Female    1990 Address   [address] [address] Phone   [phone] (Mobile) *Preferred*       Patient Demographics    Patient Name   Nenita Morrison   [id number] Legal Sex   Female    1990 Address   [address] [address] Phone   [phone] (Mobile) *Preferred*   CSN:   [id number]     Admit Date: Admit Time Room Bed   May 5, 2022  4:51  [[id number]] 01 [[id number]]       Attending Providers    Provider Pager From To   Chelsea Dunn MD  22   Angela Becker DO  22     IP ADM 22 MOM DELIVERED ON 22      Nursery  Record     Subjective:      Iván Sanders is a female infant born on 2022 at 7:46 PM . She weighed 3.32 kg and measured 20\"  in length. Apgars were 8 and 9. Presentation was Vertex.     Maternal Data:      Rupture Date: 2022  Rupture Time: 7:57 AM  Delivery Type: Vaginal, Spontaneous   Delivery Resuscitation: Suctioning-bulb; Tactile Stimulation    Number of Vessels: 3 Vessels    Cord Events: Other(Comment) (Comment)  Meconium Stained: None  Amniotic Fluid Description: Clear          Information for the patient's mother:  Khari Arroyo [[id number]]   Gestational Age: 44w3d
